# Patient Record
Sex: MALE | Race: WHITE | HISPANIC OR LATINO | Employment: STUDENT | URBAN - NONMETROPOLITAN AREA
[De-identification: names, ages, dates, MRNs, and addresses within clinical notes are randomized per-mention and may not be internally consistent; named-entity substitution may affect disease eponyms.]

---

## 2020-02-15 ENCOUNTER — APPOINTMENT (OUTPATIENT)
Dept: GENERAL RADIOLOGY | Facility: HOSPITAL | Age: 25
End: 2020-02-15

## 2020-02-15 ENCOUNTER — HOSPITAL ENCOUNTER (EMERGENCY)
Facility: HOSPITAL | Age: 25
Discharge: HOME | End: 2020-02-15
Attending: FAMILY MEDICINE | Admitting: FAMILY MEDICINE

## 2020-02-15 VITALS
SYSTOLIC BLOOD PRESSURE: 151 MMHG | HEIGHT: 67 IN | WEIGHT: 210 LBS | TEMPERATURE: 98.5 F | OXYGEN SATURATION: 99 % | RESPIRATION RATE: 18 BRPM | HEART RATE: 71 BPM | DIASTOLIC BLOOD PRESSURE: 98 MMHG | BODY MASS INDEX: 32.96 KG/M2

## 2020-02-15 DIAGNOSIS — S62.202A CLOSED FRACTURE OF FIRST METACARPAL BONE OF LEFT HAND, UNSPECIFIED FRACTURE MORPHOLOGY, UNSPECIFIED PORTION OF METACARPAL, INITIAL ENCOUNTER: Primary | ICD-10-CM

## 2020-02-15 PROCEDURE — 73130 X-RAY EXAM OF HAND: CPT

## 2020-02-15 PROCEDURE — 73130 X-RAY EXAM OF HAND: CPT | Performed by: RADIOLOGY

## 2020-02-15 PROCEDURE — 99284 EMERGENCY DEPT VISIT MOD MDM: CPT

## 2020-02-15 PROCEDURE — 73110 X-RAY EXAM OF WRIST: CPT | Performed by: RADIOLOGY

## 2020-02-15 PROCEDURE — 73110 X-RAY EXAM OF WRIST: CPT

## 2020-02-15 RX ORDER — IBUPROFEN 400 MG/1
800 TABLET ORAL ONCE
Status: COMPLETED | OUTPATIENT
Start: 2020-02-15 | End: 2020-02-15

## 2020-02-15 RX ORDER — DICLOFENAC SODIUM 75 MG/1
75 TABLET, DELAYED RELEASE ORAL 2 TIMES DAILY
Qty: 30 TABLET | Refills: 0 | Status: SHIPPED | OUTPATIENT
Start: 2020-02-15

## 2020-02-15 RX ADMIN — IBUPROFEN 800 MG: 400 TABLET, FILM COATED ORAL at 14:57

## 2020-02-15 NOTE — ED PROVIDER NOTES
Subjective     History provided by:  Patient  Hand Injury   Location:  Wrist and hand  Wrist location:  L wrist  Hand location:  L hand  Injury: yes    Time since incident:  1 hour  Mechanism of injury comment:  Injured while in the course of his duties while playing baseball for [employer].  Pain details:     Quality:  Aching and throbbing    Severity:  Moderate    Onset quality:  Sudden    Timing:  Constant    Progression:  Worsening  Prior injury to area:  No  Relieved by:  Nothing  Ineffective treatments:  Ice and acetaminophen  Associated symptoms: decreased range of motion, numbness, stiffness and swelling    Associated symptoms: no fever        Review of Systems   Constitutional: Negative.  Negative for fever.   HENT: Negative.    Respiratory: Negative.    Cardiovascular: Negative.  Negative for chest pain.   Gastrointestinal: Negative.  Negative for abdominal pain.   Endocrine: Negative.    Genitourinary: Negative.  Negative for dysuria.   Musculoskeletal: Positive for arthralgias and stiffness.   Skin: Negative.    Psychiatric/Behavioral: Negative.    All other systems reviewed and are negative.      No past medical history on file.    No Known Allergies    No past surgical history on file.    No family history on file.    Social History     Socioeconomic History   • Marital status: Single     Spouse name: Not on file   • Number of children: Not on file   • Years of education: Not on file   • Highest education level: Not on file           Objective   Physical Exam   Constitutional: He is oriented to person, place, and time. He appears well-developed and well-nourished. No distress.   HENT:   Head: Normocephalic and atraumatic.   Right Ear: External ear normal.   Left Ear: External ear normal.   Nose: Nose normal.   Eyes: Conjunctivae are normal.   Neck: Normal range of motion. Neck supple. No JVD present. No tracheal deviation present.   Cardiovascular: Normal rate.   No murmur  heard.  Pulmonary/Chest: Effort normal. No respiratory distress. He has no wheezes.   Abdominal: Soft. There is no tenderness.   Musculoskeletal: He exhibits tenderness. He exhibits no edema or deformity.   Anatomical snuffbox tenderness of the left wrist.  Patient is able to flex and extend all digits of the left hand.  Tenderness to palpation appears more thenar.  Tenderness starts along the distal left forearm and extends throughout the hand.   Neurological: He is alert and oriented to person, place, and time. No cranial nerve deficit.   Skin: Skin is warm and dry. No rash noted. He is not diaphoretic. No erythema. No pallor.   Psychiatric: He has a normal mood and affect. His behavior is normal. Thought content normal.   Nursing note and vitals reviewed.      Procedures           ED Course  ED Course as of Feb 15 1602   Sat Feb 15, 2020   1557 XR rad interpreted:  NONDISPLACED THIRD METACARPAL FRACTURE.     COMMUNICATION: Per this written report.    [RB]   1557 XR wrist rad interpreted:  NONDISPLACED THIRD METACARPAL FRACTURE.     COMMUNICATION: Per this written report.    [RB]      ED Course User Index  [RB] Ra Owen II, PA                                           MDM  Number of Diagnoses or Management Options  Closed fracture of first metacarpal bone of left hand, unspecified fracture morphology, unspecified portion of metacarpal, initial encounter: new and requires workup     Amount and/or Complexity of Data Reviewed  Tests in the radiology section of CPT®: ordered and reviewed    Risk of Complications, Morbidity, and/or Mortality  Presenting problems: low  Diagnostic procedures: low  Management options: low    Patient Progress  Patient progress: stable      Final diagnoses:   Closed fracture of first metacarpal bone of left hand, unspecified fracture morphology, unspecified portion of metacarpal, initial encounter            Ra Owen II, PA  02/15/20 1602